# Patient Record
Sex: FEMALE | Race: WHITE | NOT HISPANIC OR LATINO | URBAN - METROPOLITAN AREA
[De-identification: names, ages, dates, MRNs, and addresses within clinical notes are randomized per-mention and may not be internally consistent; named-entity substitution may affect disease eponyms.]

---

## 2022-05-02 ENCOUNTER — OFFICE VISIT (OUTPATIENT)
Dept: URBAN - METROPOLITAN AREA CLINIC 78 | Facility: CLINIC | Age: 27
End: 2022-05-02

## 2022-08-24 ENCOUNTER — WEB ENCOUNTER (OUTPATIENT)
Dept: URBAN - METROPOLITAN AREA CLINIC 78 | Facility: CLINIC | Age: 27
End: 2022-08-24

## 2022-08-30 ENCOUNTER — OFFICE VISIT (OUTPATIENT)
Dept: URBAN - METROPOLITAN AREA CLINIC 78 | Facility: CLINIC | Age: 27
End: 2022-08-30
Payer: COMMERCIAL

## 2022-08-30 VITALS
HEIGHT: 66 IN | SYSTOLIC BLOOD PRESSURE: 156 MMHG | DIASTOLIC BLOOD PRESSURE: 81 MMHG | TEMPERATURE: 98.1 F | HEART RATE: 87 BPM | RESPIRATION RATE: 16 BRPM

## 2022-08-30 DIAGNOSIS — R11.0 NAUSEA: ICD-10-CM

## 2022-08-30 DIAGNOSIS — R53.83 FATIGUE, UNSPECIFIED TYPE: ICD-10-CM

## 2022-08-30 DIAGNOSIS — G43.809 MIGRAINES: ICD-10-CM

## 2022-08-30 DIAGNOSIS — R10.13 DYSPEPSIA: ICD-10-CM

## 2022-08-30 PROBLEM — 37796009: Status: ACTIVE | Noted: 2022-08-30

## 2022-08-30 PROCEDURE — 99244 OFF/OP CNSLTJ NEW/EST MOD 40: CPT | Performed by: INTERNAL MEDICINE

## 2022-08-30 PROCEDURE — 99204 OFFICE O/P NEW MOD 45 MIN: CPT | Performed by: INTERNAL MEDICINE

## 2022-08-30 RX ORDER — DESIPRAMINE HYDROCHLORIDE 10 MG/1
1 TABLET TABLET, FILM COATED ORAL QHS
Qty: 30 | Refills: 1 | OUTPATIENT
Start: 2022-08-30

## 2022-08-30 RX ORDER — ONDANSETRON 8 MG/1
1 TABLET ON THE TONGUE AND ALLOW TO DISSOLVE  AS NEEDED TABLET, ORALLY DISINTEGRATING ORAL
Qty: 40 | Refills: 2 | OUTPATIENT
Start: 2022-08-30

## 2022-08-30 NOTE — HPI-TODAY'S VISIT:
The patient was self-referred to us by. A copy of this note will be sent to the referring physician.   The patient states she has been having GI issues for at least a few years. In march 2022 she started noticing severe abdominal bloating/distention with little relief. She has tried and failed Xifaxan for possible SIBO (even though HBT was negative). GI PCR was also negative. EGD and colonoscopy in June 2022 were also negative with reportedly "minor swelling in the stomach and colon." CT A/P only noted increased stool in the colon. She states she has a BM once or twice a day. She does not believe she is constipated.  She tried Omeprazole, Culturelle and IB Castillo+FD Castillo.  She tried Metamucil and Miralax daily for 3 weeks and did not feel better.  She also went to the GYN who did not find any abnormalities to explain her symptoms. She has had mild nausea but no vomiting. Weight has been stable.  She has tried GFD and lactose free diet. She had food allergy testing. She has not been able to notice any particular food triggers.   She has seen her boyfriend's uncle which is a hollistic provider. She had also seen a different GI who tested her for celiac which was negative.    The patient has never had a colonoscopy previously. There is no FH of colon cancer or colon polyps.  There is no recent history of rectal bleeding. The patient has no pertinent additional complaints of abdominal pain, constipation, diarrhea, bloating, rectal pain, anorexia or unintentional weight loss.   Regarding any upper GI complaints, the patient has not had heartburn, nausea, vomiting or dysphagia.   She had a spinal fusion at age 14 for scoliosis.  She gets migraines often. She will take Excedrin prn.   The patient does not take blood thinners.  They deny any CP or BONILLA.

## 2022-09-23 ENCOUNTER — ERX REFILL RESPONSE (OUTPATIENT)
Dept: URBAN - METROPOLITAN AREA CLINIC 78 | Facility: CLINIC | Age: 27
End: 2022-09-23

## 2022-09-23 RX ORDER — DESIPRAMINE HYDROCHLORIDE 10 MG/1
TAKE 1 TABLET BY MOUTH EVERY DAY AT BEDTIME FOR 30 DAYS TABLET, FILM COATED ORAL
Qty: 30 TABLET | Refills: 1 | OUTPATIENT

## 2022-09-23 RX ORDER — DESIPRAMINE HYDROCHLORIDE 10 MG/1
1 TABLET TABLET, FILM COATED ORAL QHS
Qty: 30 | Refills: 1 | OUTPATIENT

## 2022-10-04 ENCOUNTER — OFFICE VISIT (OUTPATIENT)
Dept: URBAN - METROPOLITAN AREA CLINIC 78 | Facility: CLINIC | Age: 27
End: 2022-10-04

## 2023-02-13 ENCOUNTER — TELEPHONE ENCOUNTER (OUTPATIENT)
Dept: URBAN - METROPOLITAN AREA CLINIC 63 | Facility: CLINIC | Age: 28
End: 2023-02-13

## 2023-02-20 ENCOUNTER — OFFICE VISIT (OUTPATIENT)
Dept: URBAN - METROPOLITAN AREA CLINIC 23 | Facility: CLINIC | Age: 28
End: 2023-02-20
Payer: COMMERCIAL

## 2023-02-20 VITALS
HEIGHT: 66 IN | TEMPERATURE: 97.4 F | HEART RATE: 95 BPM | SYSTOLIC BLOOD PRESSURE: 129 MMHG | DIASTOLIC BLOOD PRESSURE: 91 MMHG

## 2023-02-20 DIAGNOSIS — R10.11 RUQ ABDOMINAL PAIN: ICD-10-CM

## 2023-02-20 DIAGNOSIS — K58.2 IRRITABLE BOWEL SYNDROME WITH CONSTIPATION AND DIARRHEA: ICD-10-CM

## 2023-02-20 DIAGNOSIS — R14.0 ABDOMINAL BLOATING: ICD-10-CM

## 2023-02-20 PROCEDURE — 99204 OFFICE O/P NEW MOD 45 MIN: CPT | Performed by: INTERNAL MEDICINE

## 2023-02-20 RX ORDER — ONDANSETRON 8 MG/1
1 TABLET ON THE TONGUE AND ALLOW TO DISSOLVE  AS NEEDED TABLET, ORALLY DISINTEGRATING ORAL
Qty: 40 | Refills: 2 | Status: ACTIVE | COMMUNITY
Start: 2022-08-30

## 2023-02-20 NOTE — PREVIOUS WORKUP REVIEWED
. REVIEWED EXTERNAL MEDICAL RECORD ENDOSCOPIES-EGD 6/8/2022:LA grade A esophagitis. Small hiatal hernia. Diffuse mildly erythematous mucosa in the gastric mucosa. Normal duodenum.-Colonoscopy 6/8/2022:Normal TI. Mild adenomatous mucosa in the entire colon.*Pathology:Duodenum-normal. Gastric-normal. Distal esophagus-focal mild superficial neutrophilic inflammation. Random colon-normal.LABS-Labs 3/28/2022:WBC 10.3, hemoglobin 14, platelet 213, BUN 7, creatinine 0.78, calcium 9.5, total protein 7.7, albumin 4.8, total bilirubin 0.3, alkaline phosphatase 62, AST 19, ALT 10. Celiac panel negative. TSH 2.3. ESR 10. CRP 24 H.-Labs 4/27/2022:GPP negative. WBC normal. Fecal fat normal. Fecal calprotectin normal. Pancreatic elastase normal. Lactoferrin normal. H. pylori normal.IMAGES-CT abdomen pelvis without contrast 6/24/2022:No acute findings. Thoracolumbar fusion hardware appear.-HIDA scan with CCK 1/26/2023:Normal. Normal ejection fraction

## 2023-02-20 NOTE — HPI-TODAY'S VISIT:
27-year-old female presents for chronic GI issues, abdominal swelling, heaviness, firm and bloating. Over the past 4-5 months, she has had a right upper quadrant area pain more prominently.  Typically happens after meal.  Decreased appetite. Daily bowel movement, 1-2/day.  [place] Stool Scale type II-III. Occasional NSAID use. PPI tried but did not help. She had a comprehensive work-up with outside GI.  All the tests are reviewed.

## 2023-03-04 ENCOUNTER — DASHBOARD ENCOUNTERS (OUTPATIENT)
Age: 28
End: 2023-03-04

## 2023-03-04 PROBLEM — 10743008: Status: ACTIVE | Noted: 2023-03-04

## 2023-03-20 ENCOUNTER — OFFICE VISIT (OUTPATIENT)
Dept: URBAN - METROPOLITAN AREA CLINIC 78 | Facility: CLINIC | Age: 28
End: 2023-03-20